# Patient Record
Sex: MALE | Race: WHITE
[De-identification: names, ages, dates, MRNs, and addresses within clinical notes are randomized per-mention and may not be internally consistent; named-entity substitution may affect disease eponyms.]

---

## 2023-04-22 ENCOUNTER — HOSPITAL ENCOUNTER (EMERGENCY)
Dept: HOSPITAL 54 - ER | Age: 73
Discharge: HOME | End: 2023-04-22
Payer: MEDICARE

## 2023-04-22 VITALS — SYSTOLIC BLOOD PRESSURE: 122 MMHG | DIASTOLIC BLOOD PRESSURE: 65 MMHG

## 2023-04-22 VITALS — HEIGHT: 66 IN | BODY MASS INDEX: 20.25 KG/M2 | WEIGHT: 126 LBS

## 2023-04-22 DIAGNOSIS — Z79.899: ICD-10-CM

## 2023-04-22 DIAGNOSIS — I48.91: ICD-10-CM

## 2023-04-22 DIAGNOSIS — Z87.442: ICD-10-CM

## 2023-04-22 DIAGNOSIS — N39.0: Primary | ICD-10-CM

## 2023-04-22 DIAGNOSIS — Z44.8: ICD-10-CM

## 2023-04-22 LAB
BILIRUB UR QL STRIP: NEGATIVE
COLOR UR: (no result)
GLUCOSE UR STRIP-MCNC: NEGATIVE MG/DL
LEUKOCYTE ESTERASE UR QL STRIP: (no result)
NITRITE UR QL STRIP: NEGATIVE
PH UR STRIP: 7 [PH] (ref 5–8)
PROT UR QL STRIP: (no result) MG/DL
RBC #/AREA URNS HPF: (no result) /HPF (ref 0–2)
UROBILINOGEN UR STRIP-MCNC: 0.2 EU/DL
WBC #/AREA URNS HPF: (no result) /HPF (ref 0–3)

## 2023-04-22 NOTE — NUR
PATIENT PICKED UP BY [facility]BULANCE IN STABLE CONDITION GOING BACK TO SNF. WRITTEN 
AND VERBAL AFTER CARE CARE INSTRUCTION GIVEN.